# Patient Record
Sex: MALE | Race: WHITE | NOT HISPANIC OR LATINO | URBAN - METROPOLITAN AREA
[De-identification: names, ages, dates, MRNs, and addresses within clinical notes are randomized per-mention and may not be internally consistent; named-entity substitution may affect disease eponyms.]

---

## 2022-09-13 ENCOUNTER — APPOINTMENT (OUTPATIENT)
Dept: URBAN - METROPOLITAN AREA CLINIC 217 | Age: 42
Setting detail: DERMATOLOGY
End: 2022-09-13

## 2022-09-13 DIAGNOSIS — L40.0 PSORIASIS VULGARIS: ICD-10-CM

## 2022-09-13 PROCEDURE — OTHER ORDER TESTS: OTHER

## 2022-09-13 PROCEDURE — OTHER COUNSELING: OTHER

## 2022-09-13 PROCEDURE — OTHER MIPS QUALITY: OTHER

## 2022-09-13 PROCEDURE — 99204 OFFICE O/P NEW MOD 45 MIN: CPT

## 2022-09-13 PROCEDURE — OTHER TREATMENT REGIMEN: OTHER

## 2022-09-13 PROCEDURE — OTHER PRESCRIPTION: OTHER

## 2022-09-13 PROCEDURE — OTHER COUNSELING: TOPICAL STEROIDS: OTHER

## 2022-09-13 RX ORDER — TAPINAROF 10 MG/1000MG
CREAM TOPICAL
Qty: 60 | Refills: 4 | Status: ERX | COMMUNITY
Start: 2022-09-13

## 2022-09-13 ASSESSMENT — PGA PSORIASIS: PGA PSORIASIS 2020: MODERATE

## 2022-09-13 ASSESSMENT — ITCH NUMERIC RATING SCALE: HOW SEVERE IS YOUR ITCHING?: 4

## 2022-09-13 ASSESSMENT — BSA PSORIASIS: % BODY COVERED IN PSORIASIS: 8

## 2022-09-13 NOTE — PROCEDURE: TREATMENT REGIMEN
Plan: Patient in the past has been on TAC and Calcipotriene in the past that is not working. Patient psoriasis has now been affecting daily living due to psoriasis on scrotal/ penis as well as buttocks.  His corner of b/l eyes now becoming involved. Has a hx of 8 years being treated for psoriasis but getting worse and prior  treatment is not working. Other Instructions: Patient in the past has been on TAC and Calcipotriene in the past that is not working. Patient psoriasis has now been affecting daily living due to psoriasis on scrotal/ penis as well as buttocks.  His corner of b/l eyes now becoming involved. Has a hx of 8 years being treated for psoriasis but getting worse and prior  treatment is not working.

## 2022-09-13 NOTE — HPI: PSORIASIS (PATIENT REPORTED)
Do You Have A Family History Of Psoriasis?: no
Have You Been Diagnosed With Psoriatic Arthritis?: yes
Where Is Your Psoriasis Located?: Elbows, knees, legs, back
List Prescription Topical Steroids You Tried (Separate Each Name With A Comma):: Triamcinolone (may be)

## 2022-09-13 NOTE — PROCEDURE: TREATMENT REGIMEN
Other Instructions: We discussed in length treatment options including topicals and oral / systemic treatment. Talked about MTX/ Otezla and NBUVB and Sotyktu . Patient in agreement to start Vtama topicals for 3 weeks and re-evaluate to possibly start Sotyktu . Lab slip given for CBC/ lips profile/ TB / Hepatic function for patient to get prior to next visit to be ready to potentially start Sotyktu.

## 2022-09-13 NOTE — PROCEDURE: MIPS QUALITY
Quality 111:Pneumonia Vaccination Status For Older Adults: Pneumococcal vaccine (PPSV23) was not administered on or after patient’s 60th birthday and before the end of the measurement period, reason not otherwise specified
Quality 130: Documentation Of Current Medications In The Medical Record: Current Medications Documented
Detail Level: Generalized
Quality 110: Preventive Care And Screening: Influenza Immunization: Influenza Immunization Ordered or Recommended, but not Administered due to system reason

## 2022-10-12 ENCOUNTER — APPOINTMENT (OUTPATIENT)
Dept: URBAN - METROPOLITAN AREA CLINIC 217 | Age: 42
Setting detail: DERMATOLOGY
End: 2022-10-12

## 2022-10-12 DIAGNOSIS — L40.0 PSORIASIS VULGARIS: ICD-10-CM

## 2022-10-12 PROCEDURE — OTHER MEDICATION COUNSELING: OTHER

## 2022-10-12 PROCEDURE — OTHER COUNSELING: OTHER

## 2022-10-12 PROCEDURE — 99213 OFFICE O/P EST LOW 20 MIN: CPT

## 2022-10-12 PROCEDURE — OTHER TREATMENT REGIMEN: OTHER

## 2022-10-12 ASSESSMENT — LOCATION SIMPLE DESCRIPTION DERM
LOCATION SIMPLE: LEFT FOREARM
LOCATION SIMPLE: RIGHT KNEE
LOCATION SIMPLE: RIGHT FOREARM

## 2022-10-12 ASSESSMENT — PGA PSORIASIS: PGA PSORIASIS 2020: ALMOST CLEAR

## 2022-10-12 ASSESSMENT — LOCATION ZONE DERM
LOCATION ZONE: ARM
LOCATION ZONE: LEG

## 2022-10-12 ASSESSMENT — BSA PSORIASIS: % BODY COVERED IN PSORIASIS: 3

## 2022-10-12 ASSESSMENT — LOCATION DETAILED DESCRIPTION DERM
LOCATION DETAILED: RIGHT PROXIMAL DORSAL FOREARM
LOCATION DETAILED: LEFT PROXIMAL DORSAL FOREARM
LOCATION DETAILED: RIGHT KNEE

## 2022-10-12 NOTE — PROCEDURE: MEDICATION COUNSELING
Xeljustinoz Pregnancy And Lactation Text: This medication is Pregnancy Category D and is not considered safe during pregnancy.  The risk during breast feeding is also uncertain.

## 2022-10-12 NOTE — PROCEDURE: COUNSELING
Quality 410: Psoriasis Clinical Response To Oral Systemic Or Biologic Mediations: Patient has been on biologic or system therapy for less than 6 months
Detail Level: Simple

## 2022-10-12 NOTE — PROCEDURE: MEDICATION COUNSELING
Unable to consent due to medical condition Topical Clindamycin Counseling: Patient counseled that this medication may cause skin irritation or allergic reactions.  In the event of skin irritation, the patient was advised to reduce the amount of the drug applied or use it less frequently.   The patient verbalized understanding of the proper use and possible adverse effects of clindamycin.  All of the patient's questions and concerns were addressed.

## 2022-10-12 NOTE — PROCEDURE: TREATMENT REGIMEN
Action 1: Continue
Detail Level: Detailed
Other Instructions: Continue the above treatment regimen
Continue Regimen: Patient has been approved for Sotyktu  6mg tablet po daily and started it 1 1/2 weeks ago. \\nVtama 1% cream apply daily PRN

## 2022-10-14 ENCOUNTER — APPOINTMENT (OUTPATIENT)
Dept: URBAN - METROPOLITAN AREA CLINIC 217 | Age: 42
Setting detail: DERMATOLOGY
End: 2022-10-16

## 2022-10-14 DIAGNOSIS — L40.0 PSORIASIS VULGARIS: ICD-10-CM

## 2022-10-14 PROCEDURE — OTHER ORDER TESTS: OTHER

## 2022-10-14 PROCEDURE — OTHER OTHER: OTHER

## 2022-10-14 NOTE — PROCEDURE: OTHER
Detail Level: Detailed
Note Text (......Xxx Chief Complaint.): This diagnosis correlates with the
Render Risk Assessment In Note?: yes
Other (Free Text): Will order repeat tests while on Sotyktu and ask him to get in 3 months.  Repeat tests Q 3 months the first year, then yearly.

## 2023-02-14 ENCOUNTER — APPOINTMENT (OUTPATIENT)
Dept: URBAN - METROPOLITAN AREA CLINIC 217 | Age: 43
Setting detail: DERMATOLOGY
End: 2023-02-15

## 2023-02-14 DIAGNOSIS — L40.0 PSORIASIS VULGARIS: ICD-10-CM

## 2023-02-14 PROCEDURE — 99213 OFFICE O/P EST LOW 20 MIN: CPT

## 2023-02-14 PROCEDURE — OTHER COUNSELING: OTHER

## 2023-02-14 PROCEDURE — OTHER PRESCRIPTION: OTHER

## 2023-02-14 PROCEDURE — OTHER TREATMENT REGIMEN: OTHER

## 2023-02-14 RX ORDER — CLOBETASOL PROPIONATE 0.5 MG/G
OINTMENT TOPICAL BID
Qty: 30 | Refills: 3 | Status: ERX | COMMUNITY
Start: 2023-02-14

## 2023-02-14 ASSESSMENT — PGA PSORIASIS: PGA PSORIASIS 2020: MILD

## 2023-02-14 ASSESSMENT — BSA PSORIASIS: % BODY COVERED IN PSORIASIS: 5

## 2023-02-14 NOTE — PROCEDURE: TREATMENT REGIMEN
Continue Regimen: Vtama 1% cream apply daily PRN( stopped due to redness and itching) will try and restart

## 2023-02-14 NOTE — PROCEDURE: TREATMENT REGIMEN
Start Regimen: clobetasol 0.05 % topical ointment BID Apply sparingly to psoriasis twice daily as needed. Never use on face folds or genitals. Use in 2 week intervals . \\nTo Leia Initiate Regimen: clobetasol 0.05 % topical ointment BID Apply sparingly to psoriasis twice daily as needed. Never use on face folds or genitals. Use in 2 week intervals . \\nTo Leia

## 2023-02-14 NOTE — PROCEDURE: TREATMENT REGIMEN
Other Instructions: Added Clobetesol -may use in between  Vtama applications Plan: Added Clobetesol -may use in between  Vtama applications

## 2023-08-14 ENCOUNTER — APPOINTMENT (OUTPATIENT)
Dept: URBAN - METROPOLITAN AREA CLINIC 217 | Age: 43
Setting detail: DERMATOLOGY
End: 2023-08-14

## 2023-08-14 DIAGNOSIS — L40.0 PSORIASIS VULGARIS: ICD-10-CM

## 2023-08-14 PROCEDURE — OTHER COUNSELING: OTHER

## 2023-08-14 PROCEDURE — OTHER TREATMENT REGIMEN: OTHER

## 2023-08-14 PROCEDURE — 99213 OFFICE O/P EST LOW 20 MIN: CPT

## 2023-08-14 ASSESSMENT — ITCH NUMERIC RATING SCALE: HOW SEVERE IS YOUR ITCHING?: 2

## 2023-08-14 ASSESSMENT — BSA PSORIASIS: % BODY COVERED IN PSORIASIS: 3

## 2023-08-14 ASSESSMENT — PGA PSORIASIS: PGA PSORIASIS 2020: ALMOST CLEAR

## 2023-08-14 NOTE — PROCEDURE: TREATMENT REGIMEN
Action 1: Continue
Discontinue Regimen: clobetasol 0.05 % topical ointment BID Apply sparingly to psoriasis twice daily as needed. Never use on face folds or genitals. Use in 2 week intervals . \\n\\nDue to patient doing well on Vtama
Detail Level: Detailed
Other Instructions: He does have a Hx a discontinuing the sotyktu due to urinary issues per patient
Continue Regimen: Vtama 1% cream apply daily PRN( stopped due to redness and itching) will try and restart

## 2024-08-14 ENCOUNTER — APPOINTMENT (OUTPATIENT)
Dept: URBAN - METROPOLITAN AREA CLINIC 217 | Age: 44
Setting detail: DERMATOLOGY
End: 2024-08-14

## 2024-08-14 DIAGNOSIS — L40.0 PSORIASIS VULGARIS: ICD-10-CM

## 2024-08-14 PROCEDURE — OTHER OTEZLA INITIATION: OTHER

## 2024-08-14 PROCEDURE — 99214 OFFICE O/P EST MOD 30 MIN: CPT

## 2024-08-14 PROCEDURE — OTHER TREATMENT REGIMEN: OTHER

## 2024-08-14 PROCEDURE — OTHER COUNSELING: OTHER

## 2024-08-14 PROCEDURE — OTHER MIPS QUALITY: OTHER

## 2024-08-14 ASSESSMENT — PGA PSORIASIS: PGA PSORIASIS 2020: MILD

## 2024-08-14 ASSESSMENT — BSA PSORIASIS: % BODY COVERED IN PSORIASIS: 4

## 2024-08-14 ASSESSMENT — ITCH NUMERIC RATING SCALE: HOW SEVERE IS YOUR ITCHING?: 2

## 2024-08-14 NOTE — PROCEDURE: TREATMENT REGIMEN
Action 1: Continue
Discontinue Regimen: clobetasol 0.05 % topical ointment BID Apply sparingly to psoriasis twice daily as needed. Never use on face folds or genitals. Use in 2 week intervals .
Detail Level: Detailed
Plan: He does have a Hx a discontinuing the sotyktu due to urinary issues per patient\\nWe did talk about Otezla in the future  due to increase issues with arthritis , he had imaging go hands and back that showed arthritis . Possible early PsA and also has signs of enthesitis.\\nWill start a PA for Otezla
Continue Regimen: Vtama 1% cream apply daily PRN

## 2024-08-21 ENCOUNTER — RX ONLY (RX ONLY)
Age: 44
End: 2024-08-21

## 2024-08-21 RX ORDER — APREMILAST 30 MG/1
TABLET, FILM COATED ORAL
Qty: 60 | Refills: 11 | Status: ERX | COMMUNITY
Start: 2024-08-21

## 2024-08-27 ENCOUNTER — RX ONLY (RX ONLY)
Age: 44
End: 2024-08-27

## 2024-08-27 RX ORDER — APREMILAST 30 MG/1
TABLET, FILM COATED ORAL
Qty: 60 | Refills: 11 | Status: ERX

## 2024-11-18 ENCOUNTER — RX ONLY (RX ONLY)
Age: 44
End: 2024-11-18

## 2024-11-18 RX ORDER — APREMILAST 30 MG/1
TABLET, FILM COATED ORAL
Qty: 60 | Refills: 11 | Status: ERX

## 2025-01-23 ENCOUNTER — APPOINTMENT (OUTPATIENT)
Dept: URBAN - METROPOLITAN AREA CLINIC 217 | Age: 45
Setting detail: DERMATOLOGY
End: 2025-01-25

## 2025-01-23 DIAGNOSIS — L40.0 PSORIASIS VULGARIS: ICD-10-CM

## 2025-01-23 PROCEDURE — 99213 OFFICE O/P EST LOW 20 MIN: CPT

## 2025-01-23 PROCEDURE — OTHER COUNSELING: OTHER

## 2025-01-23 PROCEDURE — OTHER MIPS QUALITY: OTHER

## 2025-01-23 PROCEDURE — OTHER TREATMENT REGIMEN: OTHER

## 2025-01-23 ASSESSMENT — PGA PSORIASIS: PGA PSORIASIS 2020: MILD

## 2025-01-23 ASSESSMENT — ITCH NUMERIC RATING SCALE: HOW SEVERE IS YOUR ITCHING?: 2

## 2025-01-23 ASSESSMENT — BSA PSORIASIS: % BODY COVERED IN PSORIASIS: 4

## 2025-01-23 NOTE — PROCEDURE: TREATMENT REGIMEN
Action 1: Continue
Samples Given: Otezla starter pack x2
Initiate Regimen: Resume Otezla per protocol (evidentially stopped getting shipment and has been without medication x2 months)
Detail Level: Detailed
Plan: Mutually agree to continue with Otezla at this time and supplement with topicals. Pt was approved for Otezla, however, stopped receiving shipment for some reason and has been without med x2 months approximately. Will need to investigate why this happened. Patient provided samples in the meantime so he can resume treatment. Will have him f/u in 3-4 months to reassess status of condition
Continue Regimen: Vtama 1% cream apply daily PRN\\nclobetasol 0.05 % topical ointment BID Apply sparingly to psoriasis twice daily as needed. Never use on face folds or genitals. Use in 2 week intervals .

## 2025-03-18 ENCOUNTER — RX ONLY (RX ONLY)
Age: 45
End: 2025-03-18

## 2025-03-18 RX ORDER — APREMILAST 30 MG/1
TABLET, FILM COATED ORAL
Qty: 60 | Refills: 6 | Status: ERX

## 2025-03-24 ENCOUNTER — RX ONLY (RX ONLY)
Age: 45
End: 2025-03-24

## 2025-03-24 RX ORDER — APREMILAST 30 MG/1
TABLET, FILM COATED ORAL
Qty: 60 | Refills: 6 | Status: ERX

## 2025-05-20 ENCOUNTER — APPOINTMENT (OUTPATIENT)
Dept: URBAN - METROPOLITAN AREA CLINIC 217 | Age: 45
Setting detail: DERMATOLOGY
End: 2025-05-20

## 2025-05-20 DIAGNOSIS — L40.0 PSORIASIS VULGARIS: ICD-10-CM

## 2025-05-20 PROCEDURE — OTHER PRESCRIPTION: OTHER

## 2025-05-20 PROCEDURE — 99214 OFFICE O/P EST MOD 30 MIN: CPT

## 2025-05-20 PROCEDURE — OTHER COUNSELING: OTHER

## 2025-05-20 PROCEDURE — OTHER DIAGNOSIS COMMENT: OTHER

## 2025-05-20 PROCEDURE — OTHER TREATMENT REGIMEN: OTHER

## 2025-05-20 RX ORDER — TAPINAROF 10 MG/1000MG
CREAM TOPICAL
Qty: 60 | Refills: 4 | Status: ERX

## 2025-09-03 ENCOUNTER — RX ONLY (RX ONLY)
Age: 45
End: 2025-09-03

## 2025-09-03 RX ORDER — APREMILAST 30 MG/1
TABLET, FILM COATED ORAL
Qty: 60 | Refills: 6 | Status: ERX